# Patient Record
Sex: FEMALE | Race: WHITE | NOT HISPANIC OR LATINO | Employment: OTHER | URBAN - METROPOLITAN AREA
[De-identification: names, ages, dates, MRNs, and addresses within clinical notes are randomized per-mention and may not be internally consistent; named-entity substitution may affect disease eponyms.]

---

## 2018-02-27 LAB
INFLUENZA A ANTIGEN, POC: NEGATIVE
INFLUENZA B ANTIGEN, POC: NEGATIVE

## 2019-01-25 LAB
INFLUENZA A ANTIGEN, POC: NEGATIVE
INFLUENZA B ANTIGEN, POC: NEGATIVE
RAPID GROUP A STREP (OHS): NEGATIVE

## 2020-02-14 LAB — RAPID GROUP A STREP (OHS): NEGATIVE

## 2020-10-06 LAB
INFLUENZA A ANTIGEN, POC: NEGATIVE
INFLUENZA B ANTIGEN, POC: NEGATIVE

## 2021-05-01 LAB — SARS-COV-2 RNA RESP QL NAA+PROBE: POSITIVE

## 2021-08-16 ENCOUNTER — HISTORICAL (OUTPATIENT)
Dept: ADMINISTRATIVE | Facility: HOSPITAL | Age: 66
End: 2021-08-16

## 2021-08-16 LAB — SARS-COV-2 RNA RESP QL NAA+PROBE: NEGATIVE

## 2021-12-01 ENCOUNTER — HISTORICAL (OUTPATIENT)
Dept: ADMINISTRATIVE | Facility: HOSPITAL | Age: 66
End: 2021-12-01

## 2021-12-01 LAB — SARS-COV-2 RNA RESP QL NAA+PROBE: NEGATIVE

## 2022-04-10 ENCOUNTER — HISTORICAL (OUTPATIENT)
Dept: ADMINISTRATIVE | Facility: HOSPITAL | Age: 67
End: 2022-04-10
Payer: MEDICARE

## 2022-04-29 VITALS
HEIGHT: 68 IN | OXYGEN SATURATION: 97 % | WEIGHT: 280 LBS | DIASTOLIC BLOOD PRESSURE: 83 MMHG | SYSTOLIC BLOOD PRESSURE: 140 MMHG | BODY MASS INDEX: 42.44 KG/M2

## 2022-05-20 ENCOUNTER — OFFICE VISIT (OUTPATIENT)
Dept: URGENT CARE | Facility: CLINIC | Age: 67
End: 2022-05-20
Payer: MEDICARE

## 2022-05-20 VITALS
HEIGHT: 69 IN | WEIGHT: 270 LBS | DIASTOLIC BLOOD PRESSURE: 79 MMHG | TEMPERATURE: 100 F | SYSTOLIC BLOOD PRESSURE: 115 MMHG | OXYGEN SATURATION: 97 % | BODY MASS INDEX: 39.99 KG/M2 | RESPIRATION RATE: 18 BRPM | HEART RATE: 73 BPM

## 2022-05-20 DIAGNOSIS — U07.1 COVID-19: ICD-10-CM

## 2022-05-20 DIAGNOSIS — R05.9 COUGH: Primary | ICD-10-CM

## 2022-05-20 DIAGNOSIS — U07.1 COVID-19 VIRUS DETECTED: ICD-10-CM

## 2022-05-20 LAB
CTP QC/QA: YES
CTP QC/QA: YES
POC MOLECULAR INFLUENZA A AGN: NEGATIVE
POC MOLECULAR INFLUENZA B AGN: NEGATIVE
SARS-COV-2 RDRP RESP QL NAA+PROBE: POSITIVE

## 2022-05-20 PROCEDURE — 99213 OFFICE O/P EST LOW 20 MIN: CPT | Mod: CR,,, | Performed by: PHYSICIAN ASSISTANT

## 2022-05-20 PROCEDURE — U0002 COVID-19 LAB TEST NON-CDC: HCPCS | Mod: QW,CR,, | Performed by: PHYSICIAN ASSISTANT

## 2022-05-20 PROCEDURE — 87502 INFLUENZA DNA AMP PROBE: CPT | Mod: QW,,, | Performed by: PHYSICIAN ASSISTANT

## 2022-05-20 PROCEDURE — 87502 POCT INFLUENZA A/B MOLECULAR: ICD-10-PCS | Mod: QW,,, | Performed by: PHYSICIAN ASSISTANT

## 2022-05-20 PROCEDURE — 99213 PR OFFICE/OUTPT VISIT, EST, LEVL III, 20-29 MIN: ICD-10-PCS | Mod: CR,,, | Performed by: PHYSICIAN ASSISTANT

## 2022-05-20 PROCEDURE — U0002: ICD-10-PCS | Mod: QW,CR,, | Performed by: PHYSICIAN ASSISTANT

## 2022-05-20 RX ORDER — PREDNISONE 20 MG/1
TABLET ORAL
COMMUNITY
Start: 2021-12-18

## 2022-05-20 RX ORDER — OMEPRAZOLE 40 MG/1
40 CAPSULE, DELAYED RELEASE ORAL DAILY
COMMUNITY
Start: 2022-04-05

## 2022-05-20 RX ORDER — PROMETHAZINE HYDROCHLORIDE AND DEXTROMETHORPHAN HYDROBROMIDE 6.25; 15 MG/5ML; MG/5ML
5 SYRUP ORAL EVERY 6 HOURS PRN
Qty: 118 ML | Refills: 0 | Status: SHIPPED | OUTPATIENT
Start: 2022-05-20 | End: 2022-05-30

## 2022-05-20 RX ORDER — SIMVASTATIN 40 MG/1
TABLET, FILM COATED ORAL
COMMUNITY
Start: 2022-04-05

## 2022-05-20 RX ORDER — HYDROQUINONE 40 MG/G
CREAM TOPICAL NIGHTLY
COMMUNITY
Start: 2022-05-12

## 2022-05-20 NOTE — PATIENT INSTRUCTIONS
Drink plenty of fluids    Get plenty of rest.    Follow-up with your primary care doctor      Go to emergency department with any significant change or worsening symptoms.    Tylenol or Motrin as needed for fever.     Please quarantine for 5 days. On day 6 of illness you can return to work/school as long as you have not experienced fever in the last 24 hours.     Please add vitamin C, vitamin D, and Zinc if you do not already take these.      Previously Declined (within the last year)

## 2022-05-20 NOTE — PROGRESS NOTES
"Subjective:       Patient ID: Krysta Alvarado is a 67 y.o. female.    Vitals:  height is 5' 9" (1.753 m) and weight is 122.5 kg (270 lb). Her oral temperature is 99.9 °F (37.7 °C). Her blood pressure is 115/79 and her pulse is 73. Her respiration is 18 and oxygen saturation is 97%.     Chief Complaint: exposed to flu (X 3 days ago), Generalized Body Aches (X 3 days ago), Fever (X yesterday), and Cough (Thick mucus clear* x 3 days ago)    67 y.o. female arrived to clinic complaining of body aches, fever, cough  x 3 days ago. Highest fever 102*. Pt stated the medication that was taken to treat the symptoms was Advil and Aspirin.  Patient denies any neck stiffness or any shortness of breath or GI symptoms.    Fever   This is a new problem. Episode onset: x 3 days. The problem occurs constantly. The problem has been resolved. Maximum temperature: highest 102* The temperature was taken using an oral thermometer. Associated symptoms include coughing.   Cough  This is a new problem. Episode onset: x 3 days. The problem has been unchanged. The problem occurs constantly. Cough characteristics: thick clear mucus* Associated symptoms include a fever. Treatments tried: Advil and Aspirin. The treatment provided no relief.       Constitution: Positive for fever.   Respiratory: Positive for cough.        Objective:      Physical Exam   Constitutional: She is oriented to person, place, and time. She appears well-developed. She is cooperative.  Non-toxic appearance. She does not appear ill. No distress.   HENT:   Head: Normocephalic and atraumatic.   Ears:   Right Ear: Hearing, tympanic membrane, external ear and ear canal normal.   Left Ear: Hearing, tympanic membrane, external ear and ear canal normal.   Nose: Nose normal. No mucosal edema, rhinorrhea or nasal deformity. No epistaxis. Right sinus exhibits no maxillary sinus tenderness and no frontal sinus tenderness. Left sinus exhibits no maxillary sinus tenderness and no frontal " sinus tenderness.   Mouth/Throat: Uvula is midline, oropharynx is clear and moist and mucous membranes are normal. No trismus in the jaw. Normal dentition. No uvula swelling. No oropharyngeal exudate, posterior oropharyngeal edema or posterior oropharyngeal erythema.   Eyes: Conjunctivae and lids are normal. No scleral icterus.   Neck: Trachea normal and phonation normal. Neck supple. No edema present. No erythema present. No neck rigidity present.   Cardiovascular: Normal rate, regular rhythm, normal heart sounds and normal pulses.   Pulmonary/Chest: Effort normal and breath sounds normal. No respiratory distress. She has no decreased breath sounds. She has no rhonchi.   Abdominal: Normal appearance.   Musculoskeletal: Normal range of motion.         General: No deformity. Normal range of motion.   Neurological: She is alert and oriented to person, place, and time. She exhibits normal muscle tone. Coordination normal.   Skin: Skin is warm, dry, intact, not diaphoretic and not pale.   Psychiatric: Her speech is normal and behavior is normal. Judgment and thought content normal.   Nursing note and vitals reviewed.    Pt declines covid infusion treatment at this time.      Previous History      Review of patient's allergies indicates:   Allergen Reactions    Diphenhydramine hcl        History reviewed. No pertinent past medical history.  Current Outpatient Medications   Medication Instructions    hydroquinone 4 % Crea Topical (Top), Nightly    LISINOPRIL-HYDROCHLOROTHIAZIDE ORAL lisinopril-hydrochlorothiazide Take No date recorded No form recorded No frequency recorded No route recorded No set duration recorded No set duration amount recorded active No dosage strength recorded No dosage strength units of measure recorded    omeprazole (PRILOSEC) 40 mg, Oral, Daily    predniSONE (DELTASONE) 20 MG tablet prednisone Take Tablet (oral) for 7 days 1 tablet by mouth twice daily for 3 days, then 1 tablet by mouth once  "daily for 4 days. [id number] tablet No frequency recorded oral 7 days active 20 mg    promethazine-dextromethorphan (PROMETHAZINE-DM) 6.25-15 mg/5 mL Syrp 5 mLs, Oral, Every 6 hours PRN    simvastatin (ZOCOR) 40 MG tablet SMARTSI Tablet(s) By Mouth Every Evening     History reviewed. No pertinent surgical history.  History reviewed. No pertinent family history.    Social History     Tobacco Use    Smoking status: Never Smoker    Smokeless tobacco: Never Used        Physical Exam      Vital Signs Reviewed   /79 (BP Location: Left arm, Patient Position: Sitting)   Pulse 73   Temp 99.9 °F (37.7 °C) (Oral)   Resp 18   Ht 5' 9" (1.753 m)   Wt 122.5 kg (270 lb)   SpO2 97%   BMI 39.87 kg/m²        Procedures    Procedures     Labs     Results for orders placed or performed in visit on 22   POCT Influenza A/B MOLECULAR   Result Value Ref Range    POC Molecular Influenza A Ag Negative Negative, Not Reported    POC Molecular Influenza B Ag Negative Negative, Not Reported     Acceptable Yes    POCT COVID-19 Rapid Screening   Result Value Ref Range    POC Rapid COVID Positive (A) Negative     Acceptable Yes          Assessment:       1. Cough    2. COVID-19          Plan:         Cough  -     POCT Influenza A/B MOLECULAR  -     POCT COVID-19 Rapid Screening    COVID-19  -     promethazine-dextromethorphan (PROMETHAZINE-DM) 6.25-15 mg/5 mL Syrp; Take 5 mLs by mouth every 6 (six) hours as needed.  Dispense: 118 mL; Refill: 0    Drink plenty of fluids    Get plenty of rest.    Follow-up with your primary care doctor      Go to emergency department with any significant change or worsening symptoms.    Tylenol or Motrin as needed for fever.     Please quarantine for 5 days. On day 6 of illness you can return to work/school as long as you have not experienced fever in the last 24 hours.     Please add vitamin C, vitamin D, and Zinc if you do not already take these.              "

## 2022-09-21 ENCOUNTER — HISTORICAL (OUTPATIENT)
Dept: ADMINISTRATIVE | Facility: HOSPITAL | Age: 67
End: 2022-09-21
Payer: MEDICARE

## 2022-11-30 ENCOUNTER — OFFICE VISIT (OUTPATIENT)
Dept: URGENT CARE | Facility: CLINIC | Age: 67
End: 2022-11-30
Payer: MEDICARE

## 2022-11-30 VITALS
RESPIRATION RATE: 20 BRPM | HEART RATE: 71 BPM | WEIGHT: 270 LBS | DIASTOLIC BLOOD PRESSURE: 74 MMHG | OXYGEN SATURATION: 96 % | BODY MASS INDEX: 39.99 KG/M2 | SYSTOLIC BLOOD PRESSURE: 110 MMHG | TEMPERATURE: 99 F | HEIGHT: 69 IN

## 2022-11-30 DIAGNOSIS — R05.9 COUGH, UNSPECIFIED TYPE: Primary | ICD-10-CM

## 2022-11-30 PROCEDURE — 99213 PR OFFICE/OUTPT VISIT, EST, LEVL III, 20-29 MIN: ICD-10-PCS | Mod: ,,, | Performed by: PHYSICIAN ASSISTANT

## 2022-11-30 PROCEDURE — 99213 OFFICE O/P EST LOW 20 MIN: CPT | Mod: ,,, | Performed by: PHYSICIAN ASSISTANT

## 2022-11-30 RX ORDER — PREDNISONE 10 MG/1
10 TABLET ORAL 2 TIMES DAILY
Qty: 10 TABLET | Refills: 0 | Status: SHIPPED | OUTPATIENT
Start: 2022-11-30 | End: 2022-12-05

## 2022-11-30 RX ORDER — AZITHROMYCIN 250 MG/1
TABLET, FILM COATED ORAL
Qty: 6 TABLET | Refills: 0 | Status: SHIPPED | OUTPATIENT
Start: 2022-11-30

## 2022-11-30 NOTE — PROGRESS NOTES
"Subjective:       Patient ID: Krysta BUTTS Files is a 67 y.o. female.    Vitals:  height is 5' 9" (1.753 m) and weight is 122.5 kg (270 lb). Her temperature is 98.6 °F (37 °C). Her blood pressure is 110/74 and her pulse is 71. Her respiration is 20 and oxygen saturation is 96%.     Chief Complaint: Cough    Patient is a 67-year-old female complains of a 2 week history of a productive cough.  Denies any fever neck stiffness or any shortness of breath or GI symptoms.    ROS    Objective:      Physical Exam   Constitutional: She is oriented to person, place, and time. She appears well-developed. She is cooperative.  Non-toxic appearance. She does not appear ill. No distress.   HENT:   Head: Normocephalic and atraumatic.   Ears:   Right Ear: Hearing, tympanic membrane, external ear and ear canal normal.   Left Ear: Hearing, tympanic membrane, external ear and ear canal normal.   Nose: Nose normal. No nasal deformity. No epistaxis.   Mouth/Throat: Uvula is midline, oropharynx is clear and moist and mucous membranes are normal. No trismus in the jaw. Normal dentition. No uvula swelling. No oropharyngeal exudate, posterior oropharyngeal edema or posterior oropharyngeal erythema.   Eyes: Conjunctivae and lids are normal. No scleral icterus.   Neck: Trachea normal and phonation normal. Neck supple. No edema present. No erythema present. No neck rigidity present.   Cardiovascular: Normal rate, regular rhythm, normal heart sounds and normal pulses.   Pulmonary/Chest: Effort normal and breath sounds normal. No respiratory distress. She has no decreased breath sounds. She has no rhonchi.   Abdominal: Normal appearance.   Musculoskeletal: Normal range of motion.         General: No deformity. Normal range of motion.   Neurological: She is alert and oriented to person, place, and time. She exhibits normal muscle tone. Coordination normal.   Skin: Skin is warm, dry, intact, not diaphoretic and not pale.   Psychiatric: Her speech is " "normal and behavior is normal. Judgment and thought content normal.   Nursing note and vitals reviewed.           Previous History      Review of patient's allergies indicates:   Allergen Reactions    Diphenhydramine hcl        Past Medical History:   Diagnosis Date    GERD (gastroesophageal reflux disease)     Hyperlipidemia     Hypertension      Current Outpatient Medications   Medication Instructions    azithromycin (ZITHROMAX Z-CAROLYN) 250 MG tablet Take 2 tablets (500 mg) on  Day 1,  followed by 1 tablet (250 mg) once daily on Days 2 through 5.    hydroquinone 4 % Crea Topical (Top), Nightly    LISINOPRIL-HYDROCHLOROTHIAZIDE ORAL lisinopril-hydrochlorothiazide Take No date recorded No form recorded No frequency recorded No route recorded No set duration recorded No set duration amount recorded active No dosage strength recorded No dosage strength units of measure recorded    omeprazole (PRILOSEC) 40 mg, Oral, Daily    predniSONE (DELTASONE) 20 MG tablet prednisone Take Tablet (oral) for 7 days 1 tablet by mouth twice daily for 3 days, then 1 tablet by mouth once daily for 4 days. 2021 tablet No frequency recorded oral 7 days active 20 mg    predniSONE (DELTASONE) 10 mg, Oral, 2 times daily    simvastatin (ZOCOR) 40 MG tablet SMARTSI Tablet(s) By Mouth Every Evening     History reviewed. No pertinent surgical history.  History reviewed. No pertinent family history.    Social History     Tobacco Use    Smoking status: Never    Smokeless tobacco: Never   Substance Use Topics    Alcohol use: Not Currently        Physical Exam      Vital Signs Reviewed   /74   Pulse 71   Temp 98.6 °F (37 °C)   Resp 20   Ht 5' 9" (1.753 m)   Wt 122.5 kg (270 lb)   SpO2 96%   BMI 39.87 kg/m²        Procedures    Procedures     Labs     Results for orders placed or performed in visit on 22   POCT rapid strep A   Result Value Ref Range    Rapid Group A Strep Negative    POCT Influenza A/B Molecular   Result Value " Ref Range    Inflenza A Ag Negative     Influenza B Ag Negative      Assessment:       1. Cough, unspecified type          Plan:         Cough, unspecified type  -     azithromycin (ZITHROMAX Z-CAROLYN) 250 MG tablet; Take 2 tablets (500 mg) on  Day 1,  followed by 1 tablet (250 mg) once daily on Days 2 through 5.  Dispense: 6 tablet; Refill: 0  -     predniSONE (DELTASONE) 10 MG tablet; Take 1 tablet (10 mg total) by mouth 2 (two) times daily. for 5 days  Dispense: 10 tablet; Refill: 0       Drink plenty of fluids.     Get plenty of rest.     Tylenol or Motrin as needed.     Go to the ER with any significant change or worsening of symptoms.     Follow up with your primary care doctor.

## 2025-04-18 ENCOUNTER — OFFICE VISIT (OUTPATIENT)
Dept: URGENT CARE | Facility: CLINIC | Age: 70
End: 2025-04-18
Payer: MEDICARE

## 2025-04-18 VITALS
WEIGHT: 208 LBS | HEIGHT: 70 IN | OXYGEN SATURATION: 97 % | HEART RATE: 66 BPM | BODY MASS INDEX: 29.78 KG/M2 | SYSTOLIC BLOOD PRESSURE: 106 MMHG | TEMPERATURE: 100 F | RESPIRATION RATE: 18 BRPM | DIASTOLIC BLOOD PRESSURE: 72 MMHG

## 2025-04-18 DIAGNOSIS — U07.1 COVID: Primary | ICD-10-CM

## 2025-04-18 DIAGNOSIS — R09.89 CHEST CONGESTION: ICD-10-CM

## 2025-04-18 DIAGNOSIS — U07.1 COVID-19 VIRUS DETECTED: ICD-10-CM

## 2025-04-18 LAB
CTP QC/QA: YES
SARS CORONAVIRUS 2 ANTIGEN: POSITIVE

## 2025-04-18 RX ORDER — LISINOPRIL 10 MG/1
10 TABLET ORAL
COMMUNITY
Start: 2025-02-25

## 2025-04-18 RX ORDER — ASPIRIN 81 MG/1
81 TABLET ORAL DAILY
COMMUNITY

## 2025-04-18 RX ORDER — DICLOFENAC SODIUM 50 MG/1
50 TABLET, DELAYED RELEASE ORAL 2 TIMES DAILY
COMMUNITY
Start: 2025-02-09

## 2025-04-18 NOTE — PROGRESS NOTES
"Subjective:      Patient ID: Krysta BUTTS Files is a 70 y.o. female.    Vitals:  height is 5' 10" (1.778 m) and weight is 94.3 kg (208 lb). Her temperature is 99.8 °F (37.7 °C). Her blood pressure is 106/72 and her pulse is 66. Her respiration is 18 and oxygen saturation is 97%.     Chief Complaint: Chest Congestion     Patient is a 70 y.o. female who presents to urgent care with complaints of fever(100.6 x last night), chest congestion, fatigue, BA x2 days. Alleviating factors include mucinex, 81 aspirin, Advil with mild amount of relief. Patient denies HA, nausea, vomiting, diarrhea, SOB, CP, wheezing.  Exposure to covid        Constitution: Positive for fatigue and fever.   HENT:  Positive for congestion.    Cardiovascular: Negative.    Eyes: Negative.    Respiratory: Negative.     Gastrointestinal: Negative.    Genitourinary: Negative.    Musculoskeletal: Negative.    Skin: Negative.    Allergic/Immunologic: Negative.    Neurological: Negative.    Hematologic/Lymphatic: Negative.       Objective:     Physical Exam   Constitutional: She is oriented to person, place, and time. She appears well-developed. She is cooperative.  Non-toxic appearance. She does not appear ill. No distress.   HENT:   Head: Normocephalic and atraumatic.   Ears:   Right Ear: Hearing and external ear normal.   Left Ear: Hearing and external ear normal.   Mouth/Throat: Oropharynx is clear and moist and mucous membranes are normal.   Eyes: Conjunctivae and lids are normal.   Neck: Trachea normal and phonation normal. Neck supple. No edema present. No erythema present. No neck rigidity present.   Cardiovascular: Normal rate.   Pulmonary/Chest: Effort normal and breath sounds normal. No stridor. No respiratory distress. She has no decreased breath sounds. She has no wheezes. She has no rhonchi. She has no rales.   Abdominal: Normal appearance.   Neurological: She is alert and oriented to person, place, and time. She exhibits normal muscle tone. " Coordination normal.   Skin: Skin is warm, dry, intact, not diaphoretic and no rash.   Psychiatric: Her speech is normal and behavior is normal. Mood, judgment and thought content normal.   Nursing note and vitals reviewed.         Previous History      Review of patient's allergies indicates:   Allergen Reactions    Diphenhydramine hcl        Past Medical History:   Diagnosis Date    GERD (gastroesophageal reflux disease)     Hyperlipidemia     Hypertension      Current Outpatient Medications   Medication Instructions    aspirin (ECOTRIN) 81 mg, Daily    azithromycin (ZITHROMAX Z-CAROLYN) 250 MG tablet Take 2 tablets (500 mg) on  Day 1,  followed by 1 tablet (250 mg) once daily on Days 2 through 5.    diclofenac (VOLTAREN) 50 mg, 2 times daily    hydroquinone 4 % Crea Nightly    LISINOPRIL-HYDROCHLOROTHIAZIDE ORAL lisinopril-hydrochlorothiazide Take No date recorded No form recorded No frequency recorded No route recorded No set duration recorded No set duration amount recorded active No dosage strength recorded No dosage strength units of measure recorded    lisinopriL 10 mg    nirmatrelvir-ritonavir 300 mg (150 mg x 2)-100 mg copackaged tablets (EUA) Take 3 tablets by mouth 2 (two) times daily. Each dose contains 2 nirmatrelvir (pink tablets) and 1 ritonavir (white tablet). Take all 3 tablets together    omeprazole (PRILOSEC) 40 mg, Daily    predniSONE (DELTASONE) 20 MG tablet prednisone Take Tablet (oral) for 7 days 1 tablet by mouth twice daily for 3 days, then 1 tablet by mouth once daily for 4 days. 2021 tablet No frequency recorded oral 7 days active 20 mg    simvastatin (ZOCOR) 40 MG tablet SMARTSI Tablet(s) By Mouth Every Evening     History reviewed. No pertinent surgical history.  Family History   Problem Relation Name Age of Onset    Stomach cancer Mother      Heart failure Father      No Known Problems Sister      No Known Problems Brother         Social History[1]     Physical Exam      Vital Signs  "Reviewed   /72   Pulse 66   Temp 99.8 °F (37.7 °C)   Resp 18   Ht 5' 10" (1.778 m)   Wt 94.3 kg (208 lb)   SpO2 97%   BMI 29.84 kg/m²        Procedures    Procedures     Labs     Results for orders placed or performed in visit on 09/21/22   POCT rapid strep A    Collection Time: 01/25/19  2:40 PM   Result Value Ref Range    Rapid Group A Strep Negative    POCT Influenza A/B Molecular    Collection Time: 01/25/19  2:49 PM   Result Value Ref Range    Inflenza A Ag Negative     Influenza B Ag Negative        Assessment:     1. COVID    2. Chest congestion        Plan:   COVID Positive  Medications sent to pharmacy  Stop your cholesterol statin drug for one-week while taking Paxlovid  Current CDC guidelines recommend that you stay home until you are fever free for at least 24  hours without the use of fever reducing medications and have improvement of your symptoms.  Treat your symptoms as you would the common cold with over the counter medications. If you live with anybody, isolate yourself in a separate bedroom and use a separate bathroom. If your symptoms worsen or you develop shortness of breath or a fever over 103, seek medical attention immediately.         COVID    Chest congestion  -     SARS Coronavirus 2 Antigen, POCT Manual Read    Other orders  -     nirmatrelvir-ritonavir 300 mg (150 mg x 2)-100 mg copackaged tablets (EUA); Take 3 tablets by mouth 2 (two) times daily. Each dose contains 2 nirmatrelvir (pink tablets) and 1 ritonavir (white tablet). Take all 3 tablets together  Dispense: 30 tablet; Refill: 0                         [1]   Social History  Tobacco Use    Smoking status: Never    Smokeless tobacco: Never   Substance Use Topics    Alcohol use: Not Currently     "

## 2025-04-18 NOTE — PATIENT INSTRUCTIONS
Plan:   COVID Positive  Medications sent to pharmacy  Stop your cholesterol statin drug for one-week while taking Paxlovid  Current CDC guidelines recommend that you stay home until you are fever free for at least 24  hours without the use of fever reducing medications and have improvement of your symptoms.  Treat your symptoms as you would the common cold with over the counter medications. If you live with anybody, isolate yourself in a separate bedroom and use a separate bathroom. If your symptoms worsen or you develop shortness of breath or a fever over 103, seek medical attention immediately.